# Patient Record
(demographics unavailable — no encounter records)

---

## 2025-01-17 NOTE — PLAN
[FreeTextEntry1] : #Dry Coughing Fits - Trial of albuterol from  at upcoming competition if coughing fit occurs - Trial of honey to help with cough symptoms - Continue symptom diary - Close follow-up  #Recurrent UTIs #Episode of Pain with [place] - Schedule f/u with Dr. Lucero - RTC if pain with intercourse recurs  #HM - Labs today

## 2025-01-17 NOTE — PHYSICAL EXAM
[No Acute Distress] : no acute distress [Well-Appearing] : well-appearing [Normal Sclera/Conjunctiva] : normal sclera/conjunctiva [EOMI] : extraocular movements intact [No Lymphadenopathy] : no lymphadenopathy [Supple] : supple [Normal] : normal rate, regular rhythm, normal S1 and S2 and no murmur heard [Non Tender] : non-tender [Non-distended] : non-distended [Normal Bowel Sounds] : normal bowel sounds [Normal Posterior Cervical Nodes] : no posterior cervical lymphadenopathy [Normal Anterior Cervical Nodes] : no anterior cervical lymphadenopathy [No Joint Swelling] : no joint swelling [Grossly Normal Strength/Tone] : grossly normal strength/tone [No Rash] : no rash [Coordination Grossly Intact] : coordination grossly intact [Normal Gait] : normal gait [Normal Affect] : the affect was normal [Normal Insight/Judgement] : insight and judgment were intact

## 2025-01-17 NOTE — ASSESSMENT
[FreeTextEntry1] : #Dry Coughing Fits Pt describes fits of dry cough that occur for >1 hour, only after dance competitions - not after running or dance rehearsals. Nonproductive, no other symptoms, no sore throat, no chest pain. Mentioned it at  and was given an albuterol inhaler which she has not yet had the opportunity to use. Unclear clinical picture, differential remains broad including exercise-induced asthma vs possible stress reaction.  #Recurrent UTIs #Episode of Pain with Sex Pt seen by Dr. Lucero for recurrent UTIs, was instructed to RTC if symptoms recurred. She had a UTI recently while traveling in [place] and was treated there with a shot of Rocephin. About 1 week later she notes pain with sex that felt like dryness despite adequate lubrication. She had to stop intercourse, noted some spotting afterwards, and no further pain. Has not had sex since that time. We discussed further w/u if the pain recurs.  #HM Due for flu and covid. Thinks UTD on tdap. UTD on HPV. UTD no pap no h/o abn. Does not need STI screen today. Junel for contraception. Ok with labs today.

## 2025-01-17 NOTE — HISTORY OF PRESENT ILLNESS
[de-identified] : Ms. LOZOYA is a 28 year y/o F with PMH of recurrent UTI who presents as a new patient today to establish care. 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 After competing got a persistent dry cough for a few hours Keeps happening after competitions 7.5 min of full exertion Since then no matter where the [place] is in the [place] Persistent dry cough Notes that she was also recently sick Does not happen during training or running No wheezing Finds it more difficult to breathe at competition than during practice Lasts a least 1 hour, sometimes more Non-productive Nothing like this has ever happened before Competing tomorrow Has not tried any medications She mentioned it at Urgent care and was given an albuterol inhaler Has not gotten the chance to use it yet  #Pain with [place] Recently, started a few weeks ago Close to when last UTI resolved Had to stop intercourse one time Hasn't had sex since then Describes as not super intense,  Notes feeling like dryness despite lubrication Noted some spotting afterward No subsequent pain Has not had sex since then  #Recurrent UTIs Saw Dr. Lucero Was told to come-back if it recurred Got one in [place] and was treated differently with a shot of Rocephin This was her 3rd UTI in 2024  PMH: coughing fits as above, recurrent UTIs following with Dr. Lucero, alopecia being treated by Derm with spironolactone and minoxidil in maintenance now PSH: none Allergies: NKDA Medications: spironolactone 100 mg, minoxidil 2.5, Junel OCPs, cranberry pills, probiotic Fam Hx: gma BCA, dad's side HTN thinks p gma, p gpa DM. Social History: Lives with  Works at [employer] as a , competitive ballroom dancer Tobacco use: none Alcohol use: none Drug use: none Sexually active: Y, doesn't need testing today Diet & Exercise: dancer Mood: pretty good/trends neutral good. Has a therapist Firearms: N  #Health Maintenance Flu shot: due, potentially interested Covid vaccine: due interested Tdap: UTD HPV: vaccinated Pap: UTD, never abn STI screen: not today Family Planning: Junel

## 2025-01-17 NOTE — HEALTH RISK ASSESSMENT
[No] : No [0] : 2) Feeling down, depressed, or hopeless: Not at all (0) [PHQ-2 Negative - No further assessment needed] : PHQ-2 Negative - No further assessment needed [Patient reported PAP Smear was normal] : Patient reported PAP Smear was normal [HIV test declined] : HIV test declined [Hepatitis C test offered] : Hepatitis C test offered [Never] : Never [NO] : No

## 2025-01-30 NOTE — PHYSICAL EXAM
[General Appearance - Well Developed] : well developed [General Appearance - Well Nourished] : well nourished [General Appearance - In No Acute Distress] : no acute distress [Abdomen Soft] : soft [Costovertebral Angle Tenderness] : no ~M costovertebral angle tenderness

## 2025-01-31 NOTE — HISTORY OF PRESENT ILLNESS
[FreeTextEntry1] : Name EULALIA LOZOYA  MRN [id number]   Aug 29 1996  Contact Number: [phone] ----------------------------------------------------------------------------------------------------------------------------------------- Date of First Visit: 24 Referring Provider/PCP: Dr. Boswell -----------------------------------------------------------------------------------------------------------------------------------------  CC: recurrent UTIs  History of Present Illness: EULALIA LOZOYA is a 27 year old female who presents for evaluation recurrent UTI. Present for past 3 years, occurs about every 6 months. Over the past 6 months however 2 episodes. May 2024 treated and then symptom developed a few days ago - UA/culture pending but started on antibiotics - nitrofurantoin and metronidazole (for possible BV) - currently on.  Always culture proven. Symptoms: dysuria, frequency. Reports symptoms felt stronger this time around. No fevers, but reports had some goosebumps and generalized discomfort. Improved with antibiotics. No flank pain. Never had febrile UTI or flank pain. This episode felt related to intercourse but not all are. No history nephrolithiasis.  Between episodes denies any issues with urination.   Prior treatments: antibiotics with episodes. Drinks > 8 cups water, daily probiotics  PVR (to ensure adequate emptying): 0 ---------------------------------------------------------------------------------------------------------------------------------------- Interval History (2025):  Urine culture urgent care 24: 10-20K e coli (R to amp, sensitive to rest) Started cranberry ppx and probiotics. Was traveling to [place] end of December - severe dysuria. No fevers or chills. Went to urgent care and gave dose IV ceftriaxone and 10 days ciprofloxacin. Reports culture was ultimately mixed marnie. Symptoms have resolved. Back to baseline without issues. Felt past episode intercourse related.  ---------------------------------------------------------------------------------------------------------------------------------------- PMH: alopecia PSH: none Family History: maternal grandmother breast cancer in 60s Social: engaged, no children, , never smoker, no alcohol, no recreational drugs Meds: junel, spironolactone, minoxidol, nitrofurantoin, metronidazole Allergies: NKDA ROS: no fevers, chills, flank pain ----------------------------------------------------------------------------------------------------------------------------------------- Labs:  24: UA-dip: nitrite neg, LE positive  UCx: 10-20K e coli (R to amp, sensitive to rest)  24: UA nitrite neg LE positive, 11-20 WBC, 0-2 RBC UCx: 20-50K e. coli, 10-20K e. faecalis   10/9/23: staph saprophyticus >100K

## 2025-01-31 NOTE — LETTER BODY
[Dear  ___] : Dear  [unfilled], [Courtesy Letter:] : I had the pleasure of seeing your patient, [unfilled], in my office today. [Please see my note below.] : Please see my note below. [Consult Closing:] : Thank you very much for allowing me to participate in the care of this patient.  If you have any questions, please do not hesitate to contact me. [Sincerely,] : Sincerely, [FreeTextEntry3] : Nat Lucero MD Director of Robotic Education [facility]   [email or web address] [phone]

## 2025-01-31 NOTE — ASSESSMENT
[FreeTextEntry1] : Recurrent UTI is defined as two episodes of acute bacterial cystitis within six months or three episodes within one year. Patient meets criteria. Episodes are classified as uncomplicated given absence of known anatomic or functional abnormality of the urinary tract, immunocompromised host,  infection with multi-drug resistant bacteria, or recurrence within two weeks of initial treatment. We discussed the natural history of UTIs and strategies to prevent incidence of UTIs. We discussed the data related to increase water intake and cranberry extract daily. We discussed antibiotic prophylaxis both continuous and post-coital. Discussed role of hiprex.  Recent episode with symptoms but culture was mixed marnie - first time not culture proven but symptoms consistent. At this time patient would like to trial Hiprex + vit C. Side effects discussed. 3 month trial. If develops acute symptoms and negative cultures again will consider alternate etiologies.   Plan: - hiprex + vit C bid x 3 months - ellura, probiotics - hygiene related to intercourse - will hold on further work-up for now but will consider in future - fu 3 months

## 2025-01-31 NOTE — HISTORY OF PRESENT ILLNESS
[FreeTextEntry1] : Name EULALIA LOZOYA  MRN [id number]   Aug 29 1996  Contact Number: [phone] ----------------------------------------------------------------------------------------------------------------------------------------- Date of First Visit: 24 Referring Provider/PCP: Dr. Boswell -----------------------------------------------------------------------------------------------------------------------------------------  CC: recurrent UTIs  History of Present Illness: EULALIA LOZOYA is a 27 year old female who presents for evaluation recurrent UTI. Present for past 3 years, occurs about every 6 months. Over the past 6 months however 2 episodes. May 2024 treated and then symptom developed a few days ago - UA/culture pending but started on antibiotics - nitrofurantoin and metronidazole (for possible BV) - currently on.  Always culture proven. Symptoms: dysuria, frequency. Reports symptoms felt stronger this time around. No fevers, but reports had some goosebumps and generalized discomfort. Improved with antibiotics. No flank pain. Never had febrile UTI or flank pain. This episode felt related to intercourse but not all are. No history nephrolithiasis.  Between episodes denies any issues with urination.   Prior treatments: antibiotics with episodes. Drinks > 8 cups water, daily probiotics  PVR (to ensure adequate emptying): 0 ---------------------------------------------------------------------------------------------------------------------------------------- Interval History (2025):  Urine culture urgent care 24: 10-20K e coli (R to amp, sensitive to rest) Started cranberry ppx and probiotics. Was traveling to [place] end of December - severe dysuria. No fevers or chills. Went to urgent care and gave dose IV ceftriaxone and 10 days ciprofloxacin. Reports culture was ultimately mixed marnie. Symptoms have resolved. Back to baseline without issues. Felt past episode intercourse related.  ---------------------------------------------------------------------------------------------------------------------------------------- PMH: alopecia PSH: none Family History: maternal grandmother breast cancer in 60s Social: engaged, no children, , never smoker, no alcohol, no recreational drugs Meds: junel, spironolactone, minoxidol, nitrofurantoin, metronidazole Allergies: NKDA ROS: no fevers, chills, flank pain ----------------------------------------------------------------------------------------------------------------------------------------- Labs:  24: UA-dip: nitrite neg, LE positive  UCx: 10-20K e coli (R to amp, sensitive to rest)  24: UA nitrite neg LE positive, 11-20 WBC, 0-2 RBC UCx: 20-50K e. coli, 10-20K e. faecalis   10/9/23: staph saprophyticus >100K

## 2025-07-18 NOTE — PHYSICAL EXAM
[Normal] : mucosa is normal [Midline] : trachea located in midline position [de-identified] : bilateral cerumen impaction- cleaned away with suction/curette with no issues.

## 2025-07-18 NOTE — HISTORY OF PRESENT ILLNESS
[de-identified] : 7/18/25: 27 y/o F presents with cough for the past month. Cough is present in mornings, daytime coughing fits, and evenings, and there have been instances where she will wake up at least one time in the night. Initially cough was dry, now it is wet. Cough can be triggered by certain scents such as strong shampoos, and smoke. No issues eating, chewing, or swallowing. No voice issues. She saw her internist who recommended she see ENT. In the past she saw primary care for asthma evaluation, did not have PFTs. She denies preceding upper respiratory infection. Nonsmoker. She complains of some postnasal drip.   Diet: +1 cup of coffee, chocolate, citrus, tomato, garlic, onion, carbonated beverages  -tea, soda, mint, blueberry, spicy food water intake: 3 large  late night eating:

## 2025-07-18 NOTE — PROCEDURE
[FreeTextEntry3] :  Ear cleaning: Cerumen Removal/Ear Cleaning for Otitis Externa Pre-operative Diagnosis: Bilateral Cerumen Impaction Post-operative Diagnosis: Same Procedure: Binocular microscopy with cerumen removal- [id number] Procedure Details: The patient was placed in the supine position. The operating microscope was positioned. I then placed the ear speculum in the EAC. Cerumen was then removed using a mixture of otologic curettes, and suction. The TM was noted to be intact. I then performed the procedure of the opposite ear in similar fashion. The patient tolerated procedure well. Findings: Bilateral Ear Canal - normal Bilateral Tympanic Membrane - normal Recommendations: Debrox Complications: None  [FreeTextEntry6] : - Nasal Endoscopy:   Pre-operative Diagnosis: postnasal drip  Post-operative Diagnosis: normal  Anesthesia: Topical Procedure: Bilateral nasal endoscopy Procedure Details:   After topical anesthesia and decongestant, the patient was placed in the supine position. The telescope was passed along the left nasal floor to the nasopharynx. It was then passed into the region of the middle meatus, middle turbinate, and the sphenoethmoid region. An identical procedure was performed on the right side.   Findings: Mucosa:   normal Nasal septum: normal Discharge:  none Turbinates:  normal Adenoid:   normal Posterior choanae:  normal Eustachian tubes:  normal Mucous stranding:  normal  Lesions:   Not present   Comments:   Condition: Stable. Patient tolerated procedure well.  [de-identified] : -   Pre-operative Diagnosis: Dysphonia, Throat discomfort Post-operative Diagnosis: laryngopharyngeal reflux, right vocal process granuloma Anesthesia: Topical - 1 % Lidocaine/Phenylephrine Procedure: Flexible Laryngoscopy with Stroboscopy - [facility] [id number]   Procedure Details: The patient was placed in the sitting position. After decongestant and anesthesia were applied the laryngoscope was passed. The nasal cavities, nasopharynx, oropharynx, hypopharynx, and larynx were all examined. Vocal folds were examined during respiration and phonation. The following findings were noted:   Findings: Nose: Septum is midline, turbinates are normal, nasal airways patent, mucosa normal Nasopharynx: Adenoids normal, no masses, eustachian tube normal Oropharynx: Pharyngeal walls symmetric and without lesion. Tonsils/fossae symmetric Hypopharynx: Hypopharynx and pyriform sinuses without lesion. No masses or asymmetry. Larynx: Epiglottis and aryepiglottic folds were sharp and crisp bilaterally. Bilateral false vocal folds normal appearance. Airway was widely patent. + postcricoid edema, erythema of the vocal process, right vocal process granuloma   Strobe Exam Ratings   TVF Appearance: mild erythema of the vocal process, right vocal process granuloma TVF Mobility: normal Edema/hypertrophy: normal, + postcricoid edema Mucus on TVF: normal Glottic Closure: normal/adequate Mucosal Wave: normal Amplitude of Vibration: normal Phase: symmetric Supraglottic Hyperfunction: none Other Findings: none   Condition: Stable. Patient tolerated procedure well.   Complications: None   --------------------------------------------------------------------   Procedure: Pharyngeal and speech evaluation, by cine or video - CPT 97168 Voice assessment was recorded via video recording on this date.   Clarity: Reduced Range: Reduced Pitch Control: Reduced Projection: Reduced Tremor: none Cough: none   Condition: Stable. Patient tolerated procedure well. Complications: None.

## 2025-07-18 NOTE — ASSESSMENT
[FreeTextEntry1] : - 7/18/25: 27 y/o F presents with cough for the past month. Cough is present in mornings and evenings, and there have been instances where she has multiple coughing fits and will wake up at least one time in the night. She complains of some postnasal drip. On physical exam she was found to have right vocal process granuloma. I suspect she is suffering from acute cough. I am recommending increased hydration, cough avoidance, Medrol dose pack, Tessalon Perles.   Based on history and physical exam, I do believe there is a component of laryngopharyngeal reflux. At this time I am recommending dietary and behavioral change to reduce acid in the diet. I am also recommending famotidine for a 3 months trial. I am recommending consultation with pulmonologist for asthma evaluation. Follow up in 6 weeks for repeat evaluation, 3 months to reassess LPR.  Given some of her history we will also watch out for potential vocal cord dysfunction as her cough was associated with strong smells and triggers otherwise.  On exam there is evidence of cerumen impaction. This was cleaned today without issue. Patient noted immediate improvement back to baseline.   -Consultation with pulmonology for asthma workup -Medrol dose pack -Tessalon Perles   -Dietary and behavioral modification to reduce acid reflux, handout given -Famotidine qhs -Voice hygiene, increase hydration, sips of water throughout the day, avoid throat clearing -Follow up in 6 weeks (evaluate acute cough) and 3 months for repeat evaluation for reflux and potential chronic cough